# Patient Record
Sex: MALE | Race: OTHER | Employment: FULL TIME | URBAN - METROPOLITAN AREA
[De-identification: names, ages, dates, MRNs, and addresses within clinical notes are randomized per-mention and may not be internally consistent; named-entity substitution may affect disease eponyms.]

---

## 2017-03-04 ENCOUNTER — TELEPHONE (OUTPATIENT)
Dept: FAMILY MEDICINE CLINIC | Facility: CLINIC | Age: 42
End: 2017-03-04

## 2017-03-04 NOTE — TELEPHONE ENCOUNTER
Geetha from [id number] [facility] [address]  N office is calling to request lab results from September. Geetha states pt is currently at the office. Please fax to [phone] will be faxing over [employer] form.

## 2021-01-02 ENCOUNTER — HOSPITAL ENCOUNTER (OUTPATIENT)
Age: 46
Discharge: ANOTHER HOSPITAL | End: 2021-01-02
Payer: COMMERCIAL

## 2021-01-02 VITALS
HEIGHT: 64 IN | OXYGEN SATURATION: 100 % | HEART RATE: 101 BPM | RESPIRATION RATE: 18 BRPM | WEIGHT: 145 LBS | BODY MASS INDEX: 24.75 KG/M2 | SYSTOLIC BLOOD PRESSURE: 142 MMHG | DIASTOLIC BLOOD PRESSURE: 83 MMHG | TEMPERATURE: 98 F

## 2021-01-02 DIAGNOSIS — K62.89 ANAL OR RECTAL PAIN: Primary | ICD-10-CM

## 2021-01-02 DIAGNOSIS — R73.9 HYPERGLYCEMIA: ICD-10-CM

## 2021-01-02 LAB — GLUCOSE BLDC GLUCOMTR-MCNC: 330 MG/DL (ref 70–99)

## 2021-01-02 PROCEDURE — 99203 OFFICE O/P NEW LOW 30 MIN: CPT | Performed by: NURSE PRACTITIONER

## 2021-01-02 PROCEDURE — 82962 GLUCOSE BLOOD TEST: CPT | Performed by: NURSE PRACTITIONER

## 2021-01-02 NOTE — ED PROVIDER NOTES
Patient presents with:  Anal Problem      HPI:     Kaylee Vera is a 54year old male who presents for rectal pain. The patient states he has had rectal pain for the past 2 days. He states he had a surgery on his rectum at [facility] 2 to 3 years ago. activity        Days per week: Not on file        Minutes per session: Not on file      Stress: Not on file    Relationships      Social connections        Talks on phone: Not on file        Gets together: Not on file        Attends [employer] service: Not tenderness. Bowel sounds active. A rectal exam was done. There is no visible lesions or discomfort on the outside of the rectum. On the inside of the rectum the patient has a lot of discomfort to all areas. There is no palpable masses.   No visible blee

## 2021-01-02 NOTE — ED INITIAL ASSESSMENT (HPI)
Pt complaining of rectal pain for 2 days. Trouble with bm. No blood with bm. Pt used otc suppository for pain. Some relief with suppositories. Pt is also a diabetic, but has not been taking metformin for 4-5 years.  Pt does not have a primary care